# Patient Record
Sex: MALE | Race: WHITE | NOT HISPANIC OR LATINO | Employment: FULL TIME | URBAN - NONMETROPOLITAN AREA
[De-identification: names, ages, dates, MRNs, and addresses within clinical notes are randomized per-mention and may not be internally consistent; named-entity substitution may affect disease eponyms.]

---

## 2023-04-04 ENCOUNTER — OFFICE VISIT (OUTPATIENT)
Dept: PRIMARY CARE | Facility: CLINIC | Age: 33
End: 2023-04-04
Payer: COMMERCIAL

## 2023-04-04 VITALS
SYSTOLIC BLOOD PRESSURE: 142 MMHG | HEART RATE: 98 BPM | BODY MASS INDEX: 22.73 KG/M2 | WEIGHT: 150 LBS | HEIGHT: 68 IN | DIASTOLIC BLOOD PRESSURE: 94 MMHG | OXYGEN SATURATION: 98 %

## 2023-04-04 DIAGNOSIS — F41.9 ANXIETY: ICD-10-CM

## 2023-04-04 DIAGNOSIS — F98.8 ATTENTION DEFICIT DISORDER (ADD) WITHOUT HYPERACTIVITY: Primary | ICD-10-CM

## 2023-04-04 DIAGNOSIS — N50.9 TESTICLE TROUBLE: ICD-10-CM

## 2023-04-04 PROCEDURE — 4004F PT TOBACCO SCREEN RCVD TLK: CPT | Performed by: INTERNAL MEDICINE

## 2023-04-04 PROCEDURE — 80365 DRUG SCREENING OXYCODONE: CPT

## 2023-04-04 PROCEDURE — 80368 SEDATIVE HYPNOTICS: CPT

## 2023-04-04 PROCEDURE — 80361 OPIATES 1 OR MORE: CPT

## 2023-04-04 PROCEDURE — 80373 DRUG SCREENING TRAMADOL: CPT

## 2023-04-04 PROCEDURE — 99204 OFFICE O/P NEW MOD 45 MIN: CPT | Performed by: INTERNAL MEDICINE

## 2023-04-04 PROCEDURE — 80346 BENZODIAZEPINES1-12: CPT

## 2023-04-04 PROCEDURE — 80349 CANNABINOIDS NATURAL: CPT

## 2023-04-04 PROCEDURE — 80358 DRUG SCREENING METHADONE: CPT

## 2023-04-04 PROCEDURE — 80354 DRUG SCREENING FENTANYL: CPT

## 2023-04-04 PROCEDURE — 80307 DRUG TEST PRSMV CHEM ANLYZR: CPT

## 2023-04-04 RX ORDER — DEXTROAMPHETAMINE SACCHARATE, AMPHETAMINE ASPARTATE MONOHYDRATE, DEXTROAMPHETAMINE SULFATE AND AMPHETAMINE SULFATE 5; 5; 5; 5 MG/1; MG/1; MG/1; MG/1
20 CAPSULE, EXTENDED RELEASE ORAL EVERY MORNING
Qty: 30 CAPSULE | Refills: 0 | Status: SHIPPED | OUTPATIENT
Start: 2023-04-04 | End: 2023-04-05

## 2023-04-04 ASSESSMENT — ENCOUNTER SYMPTOMS
FEVER: 0
NAUSEA: 0
CHILLS: 0
CARDIOVASCULAR NEGATIVE: 1
DIARRHEA: 0
SHORTNESS OF BREATH: 0
AGITATION: 0
COUGH: 0
PALPITATIONS: 0
PSYCHIATRIC NEGATIVE: 1
MUSCULOSKELETAL NEGATIVE: 1
NUMBNESS: 0
GASTROINTESTINAL NEGATIVE: 1
NECK STIFFNESS: 0
JOINT SWELLING: 0
EYES NEGATIVE: 1
VOMITING: 0
DYSURIA: 0
HEADACHES: 0
ADENOPATHY: 0
BACK PAIN: 0
RESPIRATORY NEGATIVE: 1
CONFUSION: 0
ABDOMINAL PAIN: 0
CONSTIPATION: 0
ALLERGIC/IMMUNOLOGIC NEGATIVE: 1
EYE DISCHARGE: 0
WHEEZING: 0
ENDOCRINE NEGATIVE: 1
CONSTITUTIONAL NEGATIVE: 1
HEMATOLOGIC/LYMPHATIC NEGATIVE: 1
LIGHT-HEADEDNESS: 0
ABDOMINAL DISTENTION: 0
NEUROLOGICAL NEGATIVE: 1

## 2023-04-04 ASSESSMENT — PATIENT HEALTH QUESTIONNAIRE - PHQ9
1. LITTLE INTEREST OR PLEASURE IN DOING THINGS: NOT AT ALL
SUM OF ALL RESPONSES TO PHQ9 QUESTIONS 1 AND 2: 0
2. FEELING DOWN, DEPRESSED OR HOPELESS: NOT AT ALL

## 2023-04-04 NOTE — PROGRESS NOTES
Subjective   Patient ID: Houston Sommers is a 32 y.o. male who presents for [facility].  HERE TO ESTABLISH  HAS BEEN DIAGNOSED WITH ADD AS KID BUT HER MOM REFUSED MEDS, HE TOOK SOME WHEN GOT OLDER, IT HELPED HIM A LOT WITH FOCUSING AND CONCENTRATION  HAS DIFFICULT WITH FOCUSING AND CONCENTRATION, DIFFICULTY AT WORK  HAS HAD PROBLEMS WITH ANXIETY DENIES DEPRESSION      C/O FEEL THAT TESTIS MOVES UP IN  HIS STOMACH DURING THE SEX NO PAIN        Review of Systems   Constitutional: Negative.  Negative for chills and fever.   HENT: Negative.  Negative for congestion.    Eyes: Negative.  Negative for discharge.   Respiratory: Negative.  Negative for cough, shortness of breath and wheezing.    Cardiovascular: Negative.  Negative for chest pain, palpitations and leg swelling.   Gastrointestinal: Negative.  Negative for abdominal distention, abdominal pain, constipation, diarrhea, nausea and vomiting.   Endocrine: Negative.    Genitourinary: Negative.  Negative for dysuria and urgency.   Musculoskeletal: Negative.  Negative for back pain, joint swelling and neck stiffness.   Skin: Negative.  Negative for rash.   Allergic/Immunologic: Negative.  Negative for immunocompromised state.   Neurological: Negative.  Negative for light-headedness, numbness and headaches.   Hematological: Negative.  Negative for adenopathy.   Psychiatric/Behavioral: Negative.  Negative for agitation, behavioral problems, confusion and suicidal ideas.    All other systems reviewed and are negative.      Objective   Physical Exam  Vitals reviewed.   Constitutional:       General: He is not in acute distress.     Appearance: Normal appearance.   HENT:      Head: Normocephalic and atraumatic.      Nose: Nose normal.   Eyes:      Conjunctiva/sclera: Conjunctivae normal.      Pupils: Pupils are equal, round, and reactive to light.   Neck:      Vascular: No carotid bruit.   Cardiovascular:      Rate and Rhythm: Normal rate and regular rhythm.       "Pulses: Normal pulses.      Heart sounds:      No gallop.   Pulmonary:      Effort: Pulmonary effort is normal. No respiratory distress.      Breath sounds: Normal breath sounds. No wheezing.   Abdominal:      General: Bowel sounds are normal.      Palpations: Abdomen is soft.      Tenderness: There is no abdominal tenderness.   Genitourinary:     Comments: TESTIS SIMIN BUT FREELY CAN BE PUSHED UP TO THE INGUINAL CANAL  Musculoskeletal:         General: Normal range of motion.      Cervical back: Normal range of motion. No rigidity.   Lymphadenopathy:      Cervical: No cervical adenopathy.   Skin:     General: Skin is warm.      Findings: No rash.   Neurological:      General: No focal deficit present.      Mental Status: He is alert and oriented to person, place, and time.   Psychiatric:         Mood and Affect: Mood normal.         Behavior: Behavior normal.       BP (!) 142/94 (BP Location: Left arm, Patient Position: Sitting)   Pulse 98   Ht 1.727 m (5' 8\")   Wt 68 kg (150 lb)   SpO2 98%   BMI 22.81 kg/m²    No results found for: CBCDIF, CMPLAS, PSA, LASA, HGBA1C  Assessment/Plan   Problem List Items Addressed This Visit          Other    Attention deficit disorder (ADD) without hyperactivity - Primary    Relevant Medications    amphetamine-dextroamphetamine XR (Adderall XR) 20 mg 24 hr capsule    Other Relevant Orders    CBC and Auto Differential    Comprehensive Metabolic Panel    Lipid Panel    TSH with reflex to Free T4 if abnormal    Vitamin B12    Vitamin D, Total    Folate    Opiate/Opioid/Benzo Extended Prescription Compliance    Anxiety    Relevant Orders    CBC and Auto Differential    Comprehensive Metabolic Panel    Lipid Panel    TSH with reflex to Free T4 if abnormal    Vitamin B12    Vitamin D, Total    Folate     Other Visit Diagnoses       Testicle trouble        Relevant Orders    Referral to Urology           MONITOR BP   GOAL BP LOWER THAN 130/80  LOW SALT  EXERCISE DAILY    O[facility] HAS " BEEN REVIEWED AND IS CONSISTENT WITH PRESCRIBED MEDICATIONS, CONSIDERED THE RISK OF ABUSE, DEPENDENCE, ADDICTION AND DIVERSION, MEDICATION IS FELT TO BE CLINICALLY APPROPRIATE ON THE DOCUMENTED DIAGNOSIS    Fu 1 mo bw reeval anxiety eval    MDM    1) COMPLEXITY: 1 UNDIAGNOSED NEW PROBLEM WITH UNCERTAIN PROGNOSIS  2)DATA: TESTS INTERPRETED AND OR ORDERED, TOOK INDEPENDENT HISTORY OR RECORDS REVIEWED  3)RISK: MODERATE RISK DUE TO NATURE OF MEDICAL CONDITIONS/COMORBIDITY OR MEDICATIONS ORDERED OR SURGICAL OR PROCEDURE REFERRAL, .

## 2023-04-05 ENCOUNTER — TELEPHONE (OUTPATIENT)
Dept: PRIMARY CARE | Facility: CLINIC | Age: 33
End: 2023-04-05
Payer: COMMERCIAL

## 2023-04-05 DIAGNOSIS — F12.90 MARIJUANA USE: Primary | ICD-10-CM

## 2023-04-05 DIAGNOSIS — F98.8 ATTENTION DEFICIT DISORDER (ADD) WITHOUT HYPERACTIVITY: Primary | ICD-10-CM

## 2023-04-05 RX ORDER — ATOMOXETINE 40 MG/1
40 CAPSULE ORAL DAILY
Qty: 30 CAPSULE | Refills: 1 | Status: SHIPPED | OUTPATIENT
Start: 2023-04-05 | End: 2023-04-27

## 2023-04-05 NOTE — TELEPHONE ENCOUNTER
CALLED AND LEFT DETAILED MESSAGE THAT DR ESQUIVEL SENT IN NON CONTROLLED RX FOR ADD (STRATTERA) AND IF ANY FURTHER QUESTIONS TO SCHEDULE APPT OR BRING UP AT NEXT APPT 5/2/2023.   TASK COMPLETE   [Negative] : Heme/Lymph

## 2023-04-05 NOTE — TELEPHONE ENCOUNTER
PATIENT CALLED AND WAS CONCERNED BECAUSE HE SAW THAT HIS URINE TEST SHOWED MARIJUANA. PT STATES HE HAD THE LEGAL DELTA PENS FROM THE STORE 2 MONTHS AGO. HE HAD CBD GUMMIES (NO THC) 1-2 WEEKS AGO. NONE SINCE. PT IS ON PROBATION AND STATES HE DOES NOT DO ILLEGAL MARIJUANA. PLEASE ADVISE? HE STILL HAS THE BOTTLE OF THE CBD GUMMIES IF YOU NEED TO SEE IT?   HE WANTED TO KNOW NEXT STEP SO HE CAN GET HIS ADDERALL OR WHAT TO DO? PLEASE ADVISE.

## 2023-04-06 LAB
6-ACETYLMORPHINE: <25 NG/ML
7-AMINOCLONAZEPAM: <25 NG/ML
ALPHA-HYDROXYALPRAZOLAM: <25 NG/ML
ALPHA-HYDROXYMIDAZOLAM: <25 NG/ML
ALPRAZOLAM: <25 NG/ML
AMPHETAMINE (PRESENCE) IN URINE BY SCREEN METHOD: ABNORMAL
BARBITURATES PRESENCE IN URINE BY SCREEN METHOD: ABNORMAL
CANNABINOIDS IN URINE BY SCREEN METHOD: ABNORMAL
CHLORDIAZEPOXIDE: <25 NG/ML
CLONAZEPAM: <25 NG/ML
COCAINE (PRESENCE) IN URINE BY SCREEN METHOD: ABNORMAL
CODEINE: <50 NG/ML
CREATINE, URINE FOR DRUG: 250.5 MG/DL
DIAZEPAM: <25 NG/ML
DRUG SCREEN COMMENT URINE: ABNORMAL
EDDP: <25 NG/ML
FENTANYL CONFIRMATION, URINE: <2.5 NG/ML
HYDROCODONE: <25 NG/ML
HYDROMORPHONE: <25 NG/ML
LORAZEPAM: <25 NG/ML
METHADONE CONFIRMATION,URINE: <25 NG/ML
MIDAZOLAM: <25 NG/ML
MORPHINE URINE: <50 NG/ML
NORDIAZEPAM: <25 NG/ML
NORFENTANYL: <2.5 NG/ML
NORHYDROCODONE: <25 NG/ML
NOROXYCODONE: <25 NG/ML
O-DESMETHYLTRAMADOL: <50 NG/ML
OXAZEPAM: <25 NG/ML
OXYCODONE: <25 NG/ML
OXYMORPHONE: <25 NG/ML
PHENCYCLIDINE (PRESENCE) IN URINE BY SCREEN METHOD: ABNORMAL
TEMAZEPAM: <25 NG/ML
TRAMADOL: <50 NG/ML
ZOLPIDEM METABOLITE (ZCA): <25 NG/ML
ZOLPIDEM: <25 NG/ML

## 2023-04-08 LAB — 11-NOR-9-CARBOXY-THC, URN, QUANT: 100 NG/ML

## 2023-04-27 DIAGNOSIS — F98.8 ATTENTION DEFICIT DISORDER (ADD) WITHOUT HYPERACTIVITY: ICD-10-CM

## 2023-04-27 RX ORDER — ATOMOXETINE 40 MG/1
CAPSULE ORAL
Qty: 30 CAPSULE | Refills: 1 | Status: SHIPPED | OUTPATIENT
Start: 2023-04-27

## 2023-05-25 ENCOUNTER — TELEPHONE (OUTPATIENT)
Dept: PRIMARY CARE | Facility: CLINIC | Age: 33
End: 2023-05-25
Payer: COMMERCIAL